# Patient Record
Sex: MALE | Race: WHITE | Employment: OTHER | URBAN - METROPOLITAN AREA
[De-identification: names, ages, dates, MRNs, and addresses within clinical notes are randomized per-mention and may not be internally consistent; named-entity substitution may affect disease eponyms.]

---

## 2024-01-15 ENCOUNTER — HOSPITAL ENCOUNTER (OUTPATIENT)
Dept: LAB | Facility: HOSPITAL | Age: 64
Discharge: HOME | End: 2024-01-15
Attending: INTERNAL MEDICINE
Payer: MEDICARE

## 2024-01-15 LAB
ALBUMIN SERPL-MCNC: 3.7 G/DL (ref 3.4–5)
ALBUMIN/GLOB SERPL: 0.9 {RATIO} (ref 1–2)
ALP LIVER SERPL-CCNC: 100 U/L
ALT SERPL-CCNC: 32 U/L
ANION GAP SERPL CALC-SCNC: 4 MMOL/L (ref 0–18)
AST SERPL-CCNC: 16 U/L (ref 15–37)
BASOPHILS # BLD AUTO: 0.07 X10(3) UL (ref 0–0.2)
BASOPHILS NFR BLD AUTO: 0.9 %
BILIRUB SERPL-MCNC: 0.5 MG/DL (ref 0.1–2)
BUN BLD-MCNC: 18 MG/DL (ref 9–23)
CALCIUM BLD-MCNC: 9 MG/DL (ref 8.5–10.1)
CHLORIDE SERPL-SCNC: 110 MMOL/L (ref 98–112)
CHOLEST SERPL-MCNC: 162 MG/DL (ref ?–200)
CO2 SERPL-SCNC: 28 MMOL/L (ref 21–32)
CREAT BLD-MCNC: 0.99 MG/DL
EGFRCR SERPLBLD CKD-EPI 2021: 86 ML/MIN/1.73M2 (ref 60–?)
EOSINOPHIL # BLD AUTO: 0.17 X10(3) UL (ref 0–0.7)
EOSINOPHIL NFR BLD AUTO: 2.2 %
ERYTHROCYTE [DISTWIDTH] IN BLOOD BY AUTOMATED COUNT: 13.2 %
EST. AVERAGE GLUCOSE BLD GHB EST-MCNC: 140 MG/DL (ref 68–126)
FASTING PATIENT LIPID ANSWER: YES
FASTING STATUS PATIENT QL REPORTED: YES
GLOBULIN PLAS-MCNC: 3.9 G/DL (ref 2.8–4.4)
GLUCOSE BLD-MCNC: 114 MG/DL (ref 70–99)
HBA1C MFR BLD: 6.5 % (ref ?–5.7)
HCT VFR BLD AUTO: 42.5 %
HDLC SERPL-MCNC: 42 MG/DL (ref 40–59)
HGB BLD-MCNC: 14 G/DL
IMM GRANULOCYTES # BLD AUTO: 0.04 X10(3) UL (ref 0–1)
IMM GRANULOCYTES NFR BLD: 0.5 %
LDLC SERPL CALC-MCNC: 76 MG/DL (ref ?–100)
LYMPHOCYTES # BLD AUTO: 2.21 X10(3) UL (ref 1–4)
LYMPHOCYTES NFR BLD AUTO: 28.7 %
MCH RBC QN AUTO: 27.8 PG (ref 26–34)
MCHC RBC AUTO-ENTMCNC: 32.9 G/DL (ref 31–37)
MCV RBC AUTO: 84.5 FL
MONOCYTES # BLD AUTO: 0.45 X10(3) UL (ref 0.1–1)
MONOCYTES NFR BLD AUTO: 5.8 %
NEUTROPHILS # BLD AUTO: 4.76 X10 (3) UL (ref 1.5–7.7)
NEUTROPHILS # BLD AUTO: 4.76 X10(3) UL (ref 1.5–7.7)
NEUTROPHILS NFR BLD AUTO: 61.9 %
NONHDLC SERPL-MCNC: 120 MG/DL (ref ?–130)
OSMOLALITY SERPL CALC.SUM OF ELEC: 297 MOSM/KG (ref 275–295)
PLATELET # BLD AUTO: 240 10(3)UL (ref 150–450)
POTASSIUM SERPL-SCNC: 4.3 MMOL/L (ref 3.5–5.1)
PROT SERPL-MCNC: 7.6 G/DL (ref 6.4–8.2)
RBC # BLD AUTO: 5.03 X10(6)UL
SODIUM SERPL-SCNC: 142 MMOL/L (ref 136–145)
T4 FREE SERPL-MCNC: 1.1 NG/DL (ref 0.8–1.7)
TRIGL SERPL-MCNC: 268 MG/DL (ref 30–149)
TSI SER-ACNC: 3.35 MIU/ML (ref 0.36–3.74)
VLDLC SERPL CALC-MCNC: 42 MG/DL (ref 0–30)
WBC # BLD AUTO: 7.7 X10(3) UL (ref 4–11)

## 2024-01-15 PROCEDURE — 36415 COLL VENOUS BLD VENIPUNCTURE: CPT | Performed by: INTERNAL MEDICINE

## 2024-01-15 PROCEDURE — 80061 LIPID PANEL: CPT | Performed by: INTERNAL MEDICINE

## 2024-01-15 PROCEDURE — 84439 ASSAY OF FREE THYROXINE: CPT | Performed by: INTERNAL MEDICINE

## 2024-01-15 PROCEDURE — 83036 HEMOGLOBIN GLYCOSYLATED A1C: CPT | Performed by: INTERNAL MEDICINE

## 2024-01-15 PROCEDURE — 84443 ASSAY THYROID STIM HORMONE: CPT | Performed by: INTERNAL MEDICINE

## 2024-01-15 PROCEDURE — 80053 COMPREHEN METABOLIC PANEL: CPT | Performed by: INTERNAL MEDICINE

## 2024-01-15 PROCEDURE — 85025 COMPLETE CBC W/AUTO DIFF WBC: CPT | Performed by: INTERNAL MEDICINE

## 2024-05-01 ENCOUNTER — HOSPITAL ENCOUNTER (OUTPATIENT)
Dept: CV DIAGNOSTICS | Facility: HOSPITAL | Age: 64
Discharge: HOME | End: 2024-05-01
Attending: INTERNAL MEDICINE
Payer: MEDICARE

## 2024-05-01 ENCOUNTER — HOSPITAL ENCOUNTER (OUTPATIENT)
Dept: LAB | Facility: HOSPITAL | Age: 64
Discharge: HOME | End: 2024-05-01
Attending: INTERNAL MEDICINE
Payer: MEDICARE

## 2024-05-01 ENCOUNTER — OFFICE VISIT (OUTPATIENT)
Dept: SLEEP CENTER | Age: 64
End: 2024-05-01
Attending: Other
Payer: MEDICARE

## 2024-05-01 DIAGNOSIS — G47.33 OSA (OBSTRUCTIVE SLEEP APNEA): ICD-10-CM

## 2024-05-01 DIAGNOSIS — R94.39 ABNORMAL STRESS TEST: ICD-10-CM

## 2024-05-01 LAB
CHOLEST SERPL-MCNC: 150 MG/DL (ref ?–200)
EST. AVERAGE GLUCOSE BLD GHB EST-MCNC: 140 MG/DL (ref 68–126)
FASTING PATIENT LIPID ANSWER: YES
HBA1C MFR BLD: 6.5 % (ref ?–5.7)
HDLC SERPL-MCNC: 39 MG/DL (ref 40–59)
LDLC SERPL CALC-MCNC: 83 MG/DL (ref ?–100)
NONHDLC SERPL-MCNC: 111 MG/DL (ref ?–130)
TRIGL SERPL-MCNC: 161 MG/DL (ref 30–149)
VLDLC SERPL CALC-MCNC: 26 MG/DL (ref 0–30)

## 2024-05-01 PROCEDURE — 95806 SLEEP STUDY UNATT&RESP EFFT: CPT

## 2024-05-01 PROCEDURE — 93306 TTE W/DOPPLER COMPLETE: CPT | Performed by: INTERNAL MEDICINE

## 2024-05-01 PROCEDURE — 80061 LIPID PANEL: CPT | Performed by: INTERNAL MEDICINE

## 2024-05-01 PROCEDURE — 83036 HEMOGLOBIN GLYCOSYLATED A1C: CPT | Performed by: INTERNAL MEDICINE

## 2024-05-01 PROCEDURE — 36415 COLL VENOUS BLD VENIPUNCTURE: CPT | Performed by: INTERNAL MEDICINE

## 2024-05-06 ENCOUNTER — SLEEP STUDY (OUTPATIENT)
Facility: CLINIC | Age: 64
End: 2024-05-06

## 2024-05-06 DIAGNOSIS — G47.9 SLEEP DISORDER: Primary | ICD-10-CM

## 2024-05-06 PROCEDURE — 95806 SLEEP STUDY UNATT&RESP EFFT: CPT | Performed by: OTHER

## 2024-08-12 NOTE — PROGRESS NOTES
This is a 63 year old male who presents with the following symptoms, risk factors, behaviors or other items associated with sleep problems.    Sleep Apnea:   overweight; high blood pressure; previous sleep study  Insomnia:  No data recorded  Restless Leg:  No data recorded  Parasomnias:   No data recorded  Daytime Problems:  memory problems    The patient's [place] Sleepiness score is 1/24.

## 2024-08-13 ENCOUNTER — OFFICE VISIT (OUTPATIENT)
Facility: CLINIC | Age: 64
End: 2024-08-13
Payer: COMMERCIAL

## 2024-08-13 VITALS
HEIGHT: 77 IN | RESPIRATION RATE: 18 BRPM | BODY MASS INDEX: 37.19 KG/M2 | OXYGEN SATURATION: 97 % | WEIGHT: 315 LBS | SYSTOLIC BLOOD PRESSURE: 134 MMHG | DIASTOLIC BLOOD PRESSURE: 82 MMHG | HEART RATE: 70 BPM | TEMPERATURE: 98 F

## 2024-08-13 DIAGNOSIS — G47.33 OSA (OBSTRUCTIVE SLEEP APNEA): Primary | ICD-10-CM

## 2024-08-13 DIAGNOSIS — E66.01 CLASS 3 SEVERE OBESITY DUE TO EXCESS CALORIES WITHOUT SERIOUS COMORBIDITY WITH BODY MASS INDEX (BMI) OF 40.0 TO 44.9 IN ADULT (HCC): ICD-10-CM

## 2024-08-13 PROCEDURE — 3008F BODY MASS INDEX DOCD: CPT | Performed by: INTERNAL MEDICINE

## 2024-08-13 PROCEDURE — 3079F DIAST BP 80-89 MM HG: CPT | Performed by: INTERNAL MEDICINE

## 2024-08-13 PROCEDURE — 99204 OFFICE O/P NEW MOD 45 MIN: CPT | Performed by: INTERNAL MEDICINE

## 2024-08-13 PROCEDURE — 3075F SYST BP GE 130 - 139MM HG: CPT | Performed by: INTERNAL MEDICINE

## 2024-08-13 RX ORDER — ATENOLOL 100 MG/1
100 TABLET ORAL DAILY
COMMUNITY

## 2024-08-13 RX ORDER — MORPHINE SULFATE 60 MG/1
60 TABLET, FILM COATED, EXTENDED RELEASE ORAL 2 TIMES DAILY
COMMUNITY

## 2024-08-13 RX ORDER — HYDROCODONE BITARTRATE AND ACETAMINOPHEN 10; 325 MG/1; MG/1
1 TABLET ORAL EVERY 4 HOURS PRN
COMMUNITY

## 2024-08-13 RX ORDER — DOCUSATE SODIUM 100 MG/1
100 CAPSULE, LIQUID FILLED ORAL
COMMUNITY
Start: 2022-05-18

## 2024-08-13 RX ORDER — PRAVASTATIN SODIUM 40 MG
60 TABLET ORAL NIGHTLY
COMMUNITY

## 2024-08-13 RX ORDER — COVID-19 ANTIGEN TEST
440 KIT MISCELLANEOUS
COMMUNITY
Start: 2022-04-25

## 2024-08-13 RX ORDER — CYCLOBENZAPRINE HCL 10 MG
10 TABLET ORAL NIGHTLY
COMMUNITY
Start: 2023-12-04

## 2024-08-13 RX ORDER — PANTOPRAZOLE SODIUM 40 MG/1
40 TABLET, DELAYED RELEASE ORAL DAILY
COMMUNITY

## 2024-08-13 RX ORDER — LEVOTHYROXINE SODIUM 112 UG/1
112 TABLET ORAL DAILY
COMMUNITY

## 2024-08-13 RX ORDER — TAMSULOSIN HYDROCHLORIDE 0.4 MG/1
CAPSULE ORAL
COMMUNITY
Start: 2022-02-07

## 2024-08-13 NOTE — PATIENT INSTRUCTIONS
Plan:      Referral for Inspire therapy if there is an exception to treat him with inspire therapy since the patient refuses treatment with CPAP machine or oral appliance  Advised about weight loss   Advised against drowsy driving and to avoid alcoholic beverage and respiratory depressants as these may worsen sleep apnea      Follow up:  as needed as patient does not wish to try CPAP machine      Brandon Holder MD      Obstructive Sleep Apnea  Obstructive sleep apnea is a condition caused by air passages becoming narrowed or blocked during sleep. As a result, breathing stops for short periods. Your body wakes up enough for breathing to start again. But you don't remember it. The cycle of stopped breathing and brief awakenings can repeat dozens of times a night. This prevents the body from getting to the deeper stages of sleep that are needed for good rest.   Signs of sleep apnea include loud snoring, noisy breathing, and gasping sounds during sleep. People with sleep apnea often find they use the bathroom many times during the night. Daytime symptoms include waking up tired after a full night's sleep and waking up with headaches. They can also include feeling very sleepy or falling asleep during the day, and having problems with memory or concentration.   Risk factors for sleep apnea include:  Being overweight  Being assigned male at birth, or being in menopause  Smoking  Using alcohol or sedating medicines  Having enlarged structures in the nose or throat such as enlarged tonsils or adenoids, or extra tissue in the airway  Home care  Lifestyle changes that can help treat snoring and sleep apnea include:   If you're overweight, talk with your healthcare provider about a weight-loss plan for you.  Don't drink alcohol for 3 to 4 hours before bedtime.  Don't take sedating medicines. Ask your healthcare provider about the medicines you take.  If you smoke, talk to your provider about ways to quit. It's important to stay  away from secondhand smoke. Don't use e-cigarettes because of their harmful side effects.  Sleep on your side. This can help prevent gravity from pulling relaxed throat tissues into your breathing passages.  If you have allergies or sinus problems that block your nose, ask your provider for help.  Use positive airway pressure (PAP). Discuss with your provider the benefits of using PAP at home. And talk about the type of PAP that's best for you.  Follow-up care  Follow up with your healthcare provider, or as advised. A diagnosis of sleep apnea is made with a sleep study. Your provider can tell you more about this test.   When to get medical care  See your healthcare provider if you have daytime symptoms of sleep apnea. These include:   Waking up tired after a full night's sleep  Waking up with a headache  Feeling very sleepy or falling asleep during the day  Having problems with memory or concentration  Also talk with your provider if your partner tells you that you snore, gasp for air, or stop breathing while you sleep.   Seeing your provider is important because sleep apnea can make you more likely to have certain health problems. These include high blood pressure, heart attack, stroke, and sexual dysfunction. If you have sleep apnea, talk with your healthcare provider about the best treatments for you.   [employer] last reviewed this educational content on 5/1/2022 © 2000-2023 [employer]. All rights reserved. This information is not intended as a substitute for professional medical care. Always follow your healthcare professional's instructions.        Continuous Positive Air Pressure (CPAP)     A mask over the nose gently directs air into the throat to keep the airway open.     Continuous positive air pressure (CPAP) uses gentle air pressure to hold the airway open. CPAP is often the most effective treatment for sleep apnea. It works very well as a treatment for adults diagnosed with obstructive  sleep apnea with a lot of sleepiness. But keep in mind that it can take several adjustments before the setup is right for you.   How CPAP works  The CPAP machine  is a small portable pump that sits beside the bed. The pump sends air through a hose, which is held over your nose alone, or nose and mouth by a mask. Mild air pressure is gently pushed through your airway. The air pressure nudges sagging tissues aside. This widens the airway so you can breathe better. CPAP may be combined with other kinds of therapy for sleep apnea.   Types of air pressure treatments  There are different types of CPAP. Your doctor or CPAP technician will help you decide which type is best for you:   Basic CPAP keeps the pressure constant all night long.  A bilevel device (BiPAP) provides more pressure when you breathe in and less when you breathe out. A BiPAP machine also may be set to provide automatic breaths to maintain breathing if you stop breathing while sleeping.  An autoCPAP device automatically adjusts pressure throughout the night and in response to changes such as body position, sleep stage, and snoring.  "[employer] last reviewed this educational content on 7/1/2019  © 2516-4104 [employer]. All rights reserved. This information is not intended as a substitute for professional medical care. Always follow your healthcare professional's instructions.

## 2024-08-13 NOTE — PROGRESS NOTES
Pulmonary/Critical Care/Sleep Medicine    Consult Note     PCP:  Dr. Maurisio ROSADO          Chief Complaint   Patient presents with    Consult     Sleep Consult - HST - 05/2024       HPI  I had the pleasure of seeing David Ga who is a pleasant 63 year old male who presents for evaluation of TYLOR    The patient states that when he exerts he gets chest pains. He was seen by Dr. Rdz who felt that his heart was fine but since the patient had history of sleep apnea he was referred to evaluate further for treatment of sleep apnea    The patient states that he has snored at home in past and has been diagnosed with TYLOR, a HST was done,  so he presents for sleep evaluation.      He does not wish to wear a CPAP mask.  He had hoped to receive inspire therapy in pulmonary office today.  He is not very interested in other options.    He states bed time around 8-9 PM . It takes 5 min to fall asleep and leaves bed around 430-5 AM. He wakes up sometimes 3-4 times a night.  He is sleepy and fatigued during the daytime.  He sleeps in a recliner. He denies nightmares, sleep talking or sleep walking.  He denies AM headaches.  He denies symptoms sleep attacks     The patient denies kicking legs at night. Denies teeth grinding.         He drinks 4 cups of caffeine coffee daily       He has no pets         Hx of tobacco use: He  reports that he has never smoked. He has never used smokeless tobacco.    Past Medical History:    Colon cancer (HCC)    History of blood clots    Hyperlipidemia    Obesity      Past Surgical History:   Procedure Laterality Date    Back surgery      Colon surgery  2009    hemicolectomy for colon cancer    Colonoscopy      Knee replacement surgery      Neck/chest procedure unlisted      Other surgical history      Tonsillectomy      Vasectomy       Not on File  Current Outpatient Medications   Medication Sig Dispense Refill    atenolol 100 MG Oral Tab Take 1 tablet (100 mg total) by mouth daily.       cyclobenzaprine 10 MG Oral Tab Take 1 tablet (10 mg total) by mouth nightly.      docusate sodium (COLACE) 100 MG Oral Cap Take 1 capsule (100 mg total) by mouth.      levothyroxine 112 MCG Oral Tab Take 1 tablet (112 mcg total) by mouth daily.      morphINE ER 60 MG Oral Tab CR Take 1 tablet (60 mg total) by mouth 2 (two) times daily.      Naproxen Sodium (ALEVE) 220 MG Oral Cap Take 440 mg by mouth.      pantoprazole 40 MG Oral Tab EC Take 1 tablet (40 mg total) by mouth daily.      pravastatin 40 MG Oral Tab Take 1.5 tablets (60 mg total) by mouth nightly.      tamsulosin 0.4 MG Oral Cap Take by mouth.      HYDROcodone-acetaminophen  MG Oral Tab Take 1 tablet by mouth every 4 (four) hours as needed for Pain.      Naloxone HCl 4 MG/0.1ML Nasal Liquid 4 mg by Nasal route. (Patient not taking: Reported on 8/13/2024)        Social History     Socioeconomic History    Marital status:    Occupational History    Occupation: [employer]   Tobacco Use    Smoking status: Never    Smokeless tobacco: Never   Substance and Sexual Activity    Alcohol use: Not Currently     Comment: once in a blue moon a beer or 2 /maybe 5 to 10 beers a year    Drug use: Never      Immunization History   Administered Date(s) Administered    Covid-19 Vaccine Moderna 100 mcg/0.5 ml 04/06/2021, 05/04/2021      Family History   Problem Relation Age of Onset    Cancer Mother     Heart Disorder Mother     Hypertension Mother     Obesity Mother     Pulmonary Disease Mother     Stroke Mother     Cancer Father     Heart Disorder Father     Hypertension Father     Pulmonary Disease Father         Review of Systems   Constitutional:  Positive for fatigue. Negative for fever and unexpected weight change.   HENT:  Negative for congestion, mouth sores, nosebleeds, postnasal drip, rhinorrhea, sore throat and trouble swallowing.    Eyes:  Negative for visual disturbance.   Respiratory:  Positive for shortness of breath. Negative  for apnea, cough, choking, chest tightness and wheezing.    Cardiovascular:  Negative for chest pain, palpitations and leg swelling.   Gastrointestinal:  Negative for abdominal pain, constipation, diarrhea, nausea and vomiting.   Genitourinary:  Negative for difficulty urinating.   Musculoskeletal:  Positive for arthralgias, back pain and myalgias. Negative for gait problem.   Neurological:  Negative for dizziness, weakness and headaches.   Psychiatric/Behavioral:  Positive for sleep disturbance.         Vitals:    08/13/24 1113   BP: 134/82   Pulse: 70   Resp: 18   Temp: 97.6 °F (36.4 °C)      SpO2: 97 %  Ht Readings from Last 1 Encounters:   08/13/24 6' 5\" (1.956 m)     Wt Readings from Last 1 Encounters:   08/13/24 (!) 365 lb (165.6 kg)     Body mass index is 43.28 kg/m².     Physical Exam  Constitutional:       General: He is not in acute distress.     Appearance: Normal appearance. He is obese. He is not ill-appearing or diaphoretic.   HENT:      Head: Normocephalic and atraumatic.      Nose: Nose normal. No congestion or rhinorrhea.      Comments: Very narrow bilateral nares      Mouth/Throat:      Mouth: Mucous membranes are moist.      Pharynx: Oropharynx is clear. No oropharyngeal exudate or posterior oropharyngeal erythema.      Comments: Mallampati class IV palate   Eyes:      Extraocular Movements: Extraocular movements intact.      Pupils: Pupils are equal, round, and reactive to light.   Cardiovascular:      Rate and Rhythm: Normal rate.      Pulses: Normal pulses.      Heart sounds: Normal heart sounds. No murmur heard.  Pulmonary:      Effort: Pulmonary effort is normal. No respiratory distress.      Breath sounds: Normal breath sounds. No wheezing or rhonchi.   Chest:      Chest wall: No tenderness.   Abdominal:      General: Abdomen is flat. Bowel sounds are normal.      Palpations: Abdomen is soft.   Musculoskeletal:         General: Normal range of motion.   Skin:     General: Skin is warm.       Comments: Stasis changes in bilateral lower extremities    Neurological:      General: No focal deficit present.      Mental Status: He is alert and oriented to person, place, and time.   Psychiatric:         Mood and Affect: Mood normal.         Behavior: Behavior normal.         Thought Content: Thought content normal.         Judgment: Judgment normal.             Labs:  Last BMP  Lab Results   Component Value Date     (H) 01/15/2024    BUN 18 01/15/2024    CREATSERUM 0.99 01/15/2024    ANIONGAP 4 01/15/2024    CA 9.0 01/15/2024     01/15/2024    K 4.3 01/15/2024     01/15/2024    CO2 28.0 01/15/2024    OSMOCALC 297 (H) 01/15/2024      Last CBC  Lab Results   Component Value Date    WBC 7.7 01/15/2024    RBC 5.03 01/15/2024    HGB 14.0 01/15/2024    HCT 42.5 01/15/2024    MCV 84.5 01/15/2024    MCH 27.8 01/15/2024    MCHC 32.9 01/15/2024    RDW 13.2 01/15/2024    .0 01/15/2024      Last CMP  Lab Results   Component Value Date     (H) 01/15/2024    BUN 18 01/15/2024    CREATSERUM 0.99 01/15/2024    ANIONGAP 4 01/15/2024    CA 9.0 01/15/2024    OSMOCALC 297 (H) 01/15/2024    ALKPHO 100 01/15/2024    AST 16 01/15/2024    ALT 32 01/15/2024    BILT 0.5 01/15/2024    TP 7.6 01/15/2024    ALB 3.7 01/15/2024    GLOBULIN 3.9 01/15/2024     01/15/2024    K 4.3 01/15/2024     01/15/2024    CO2 28.0 01/15/2024      Last Thyroid Function  Lab Results   Component Value Date    T4F 1.1 01/15/2024    TSH 3.350 01/15/2024        Imaging:  No results found.    [facility]       Accredited by the American Academy of Sleep Medicine (AASM)     PATIENT'S NAME:        PENELOPE AU  ATTENDING PHYSICIAN:   Higinio Renteria DO  REFERRING PHYSICIAN:   Fabrizio Rdz M.D.  PATIENT ACCOUNT #:     [id number]        LOCATION:       [facility]  MEDICAL RECORD #:      [id number]        YOB: 1960  DATE OF STUDY:         05/01/2024     SLEEP STUDY REPORT     STUDY TYPE:   Unattended sleep study.     CLINICAL HISTORY:  This is a 63-year-old male with a body mass index of 43 undergoing evaluation for sleep-disordered breathing due to a history of waking with palpitations, vivid dreams, disrupted sleep.  This is a report of his home sleep test.       UNATTENDED SLEEP STUDY RECORDING PARAMETERS:  The patient underwent a formal technically adequate unattended diagnostic sleep study coordinated with the [facility].  The study was performed in accordance with the AASM standard for Out of Center Sleep Testing.  The four-channel Type III HST measures the following parameters:  flow, respiratory effort, pulse, and oxygen saturation.     SCORING:  This study was scored in accordance with AASM scoring rules and Medicare rule 1B.     FINDINGS:  The flow evaluation recording time began at 8:39 p.m. and ended at 3:42 a.m., for a duration of 6 hours 57 minutes.  Minimal snoring was recorded.  There was a total of 45 hypopneas and 185 apneas, the majority of which were central apneas.  SaO2 sherron was 70%.  Total time spent with oxyhemoglobin saturations less than 88% was 25 minutes.  The majority of his hypoxia occurred in what appeared to be a REM-related pattern.  Average pulse was 67 with a maximum of 107 and a minimum of 44.       IMPRESSION:  Overall severe sleep apnea was recorded.  The majority of his events appeared to be central.  SaO2 sherron was 70%.       DIAGNOSIS:  Central sleep apnea.       RECOMMENDATIONS:    1.       At the request of the referring physician, we will confer with the patient regarding the results of the study and make treatment recommendations.   2.       Patient is a candidate for nasal CPAP, oral appliance therapy, and evaluation of the upper airway by ear, nose, throat specialist.   3.       Weight loss would likely have an ameliorating effect on the degree of sleep-disordered breathing identified, and weight loss is advised as appropriate.   4.       If  daytime sleepiness is a complaint, the patient needs to understand potential dangers associated with reduced daytime vigilance.   5.       Care should be used with the administration of anesthetic and sedative agents, and alcohol should be avoided.   6.       CPAP titration is advised, which will need to be completed as a split to further evaluate true central apnea versus obstruction.     Thank you for your confidence in the [facility].  Please do not hesitate to contact us for any questions at [phone].     Dictated By Higinio Renteria DO  d:05/09/2024 12:05:59     [place] Sleepiness Scale: (ESS) score on today's visit is 2  out of 24.     Score total of 1-6    Normal sleep   Score total of 7-8    Average sleepiness   Score total of 9-24    Abnormal (possibly pathologic) sleepiness       Impression:    Central sleep apnea as noted on home sleep study along with obstructive sleep apnea  Obstructive sleep apnea syndrome (OSAS): Home  sleep study performed on 5/1/2024 showed   There was a total of 45 hypopneas and 185 apneas, the majority of which were central apneas.  SaO2 sherron was 70%.  Total time spent with oxyhemoglobin saturations less than 88% was 25 minutes. The patient does not wish to wear CPAP mask or oral appliance. .Interested in inspire therapy but body mass index of 43.28 kg/m²  Daytime hypersomnolence/fatigue  Obesity: Class III ;  Body mass index is 43.28 kg/m².  Degenerative arthritis of the spine   Hyperlipidemia   Hx colon cancer s/p hemicolectomy in 2009                                   Plan:      Referral for Inspire therapy if there is an exception to treat him with inspire therapy since the patient refuses treatment with CPAP machine or oral appliance  Advised about weight loss   Advised against drowsy driving and to avoid alcoholic beverage and respiratory depressants as these may worsen sleep apnea      Follow up:  as needed as patient does not wish to try CPAP machine    Thank  you for allowing me to participate in your patient care.    ELYSSA Holder MD, FACP, FCCP, [facility] - Pulmonary/Critical care/Sleep Medicine  Please contact our office if you have any questions or concerns at [phone]    Note to the patient: The 21st Century Cures Act makes medical notes like these available to patients in the interest of transparency. However, be advised that this is a medical document. It is intended as peer to peer communication. It is written in medical language and may contain abbreviations or verbiage that are unfamiliar. It may appear blunt or direct. Medical documents are intended to carry relevant information, facts as evident, and clinical opinion of the practitioner.      Disclaimer: Components of this note were documented using voice recognition system and are subject to errors not corrected at proofreading. Contact the author of this note for any clarifications

## 2024-10-11 ENCOUNTER — TELEPHONE (OUTPATIENT)
Facility: CLINIC | Age: 64
End: 2024-10-11

## 2024-10-11 NOTE — TELEPHONE ENCOUNTER
LVM to follow-up on physician's note the patient may seek Inspire consultation despite not having a hx of use/failed use of pap device. Notified pt in msg I will send MCM with Inspire physician information and should they determine a route is possible for implant without the pap hx we remain available to assist.